# Patient Record
Sex: MALE | URBAN - METROPOLITAN AREA
[De-identification: names, ages, dates, MRNs, and addresses within clinical notes are randomized per-mention and may not be internally consistent; named-entity substitution may affect disease eponyms.]

---

## 2023-06-27 ENCOUNTER — APPOINTMENT (OUTPATIENT)
Dept: URBAN - METROPOLITAN AREA SURGERY 9 | Age: 33
Setting detail: DERMATOLOGY
End: 2023-06-27

## 2023-06-27 DIAGNOSIS — L80 VITILIGO: ICD-10-CM

## 2023-06-27 DIAGNOSIS — L60.3 NAIL DYSTROPHY: ICD-10-CM

## 2023-06-27 PROCEDURE — OTHER COUNSELING: OTHER

## 2023-06-27 PROCEDURE — OTHER PRESCRIPTION: OTHER

## 2023-06-27 PROCEDURE — 99204 OFFICE O/P NEW MOD 45 MIN: CPT

## 2023-06-27 PROCEDURE — OTHER MIPS QUALITY: OTHER

## 2023-06-27 RX ORDER — HYDROCORTISONE 25 MG/G
CREAM TOPICAL
Qty: 30 | Refills: 5 | Status: ERX | COMMUNITY
Start: 2023-06-27

## 2023-06-27 ASSESSMENT — LOCATION ZONE DERM
LOCATION ZONE: FINGER
LOCATION ZONE: FINGERNAIL
LOCATION ZONE: LIP
LOCATION ZONE: PENIS

## 2023-06-27 ASSESSMENT — LOCATION SIMPLE DESCRIPTION DERM
LOCATION SIMPLE: LEFT THUMB
LOCATION SIMPLE: DORSAL PENIS
LOCATION SIMPLE: RIGHT HAND
LOCATION SIMPLE: RIGHT LIP

## 2023-06-27 ASSESSMENT — LOCATION DETAILED DESCRIPTION DERM
LOCATION DETAILED: PROXIMAL DORSAL GLANS PENIS
LOCATION DETAILED: RIGHT THUMBNAIL
LOCATION DETAILED: RIGHT INFERIOR VERMILION LIP
LOCATION DETAILED: PERIUNGUAL SKIN LEFT THUMB

## 2023-06-27 NOTE — HPI: RASH
How Severe Is Your Rash?: mild
Is This A New Presentation, Or A Follow-Up?: Rash
Additional History: Pt states when he touches the area it get red and he notices some white spots. Pt has a spot on his lip that he thinks may be the same thing

## 2023-06-27 NOTE — HPI: SECONDARY COMPLAINT
How Severe Is This Condition?: mild
Additional History: Pt states his thumb nails got a line on his nail states it's gotten worse

## 2023-06-27 NOTE — PROCEDURE: COUNSELING
Patient Specific Counseling (Will Not Stick From Patient To Patient): Call for tacrolimus if no better in 2-3 months.
Detail Level: Detailed
Patient Specific Counseling (Will Not Stick From Patient To Patient): Need to stop touching affected nails paramount to see improvement if caused by trauma. May be genetic and persist however.

## 2023-06-29 ENCOUNTER — APPOINTMENT (OUTPATIENT)
Dept: URBAN - METROPOLITAN AREA SURGERY 9 | Age: 33
Setting detail: DERMATOLOGY
End: 2023-06-29

## 2023-06-29 ENCOUNTER — RX ONLY (RX ONLY)
Age: 33
End: 2023-06-29

## 2023-06-29 RX ORDER — TACROLIMUS 1 MG/G
OINTMENT TOPICAL
Qty: 60 | Refills: 5 | Status: ERX | COMMUNITY
Start: 2023-06-29